# Patient Record
Sex: MALE | Race: WHITE | NOT HISPANIC OR LATINO | URBAN - METROPOLITAN AREA
[De-identification: names, ages, dates, MRNs, and addresses within clinical notes are randomized per-mention and may not be internally consistent; named-entity substitution may affect disease eponyms.]

---

## 2024-06-26 PROBLEM — S81.801A: Status: ACTIVE | Noted: 2024-06-26

## 2024-06-26 PROBLEM — S86.901A: Status: ACTIVE | Noted: 2024-06-26

## 2024-06-26 PROBLEM — S81.001A: Status: ACTIVE | Noted: 2024-06-26

## 2024-06-26 PROBLEM — S72.431B: Status: ACTIVE | Noted: 2024-06-26

## 2024-06-26 PROBLEM — R79.89 ELEVATED TROPONIN: Status: ACTIVE | Noted: 2024-06-26

## 2024-06-26 PROBLEM — S96.901A: Status: ACTIVE | Noted: 2024-06-26

## 2024-06-26 PROBLEM — Z73.6 LIMITATION OF ACTIVITIES DUE TO DISABILITY: Status: ACTIVE | Noted: 2024-06-26

## 2024-06-26 PROBLEM — S91.001A: Status: ACTIVE | Noted: 2024-06-26

## 2024-06-27 ENCOUNTER — TELEPHONE (OUTPATIENT)
Dept: ORTHOPEDICS | Facility: CLINIC | Age: 24
End: 2024-06-27

## 2024-06-27 NOTE — TELEPHONE ENCOUNTER
----- Message from Cedric French MD sent at 6/27/2024 11:47 AM CDT -----  Regarding: postop patient  Open right knee fracture patient who needs 2 week (or sooner) postop visit.  Will need NWB 3-views Right knee

## 2024-07-02 DIAGNOSIS — M25.562 LEFT KNEE PAIN, UNSPECIFIED CHRONICITY: Primary | ICD-10-CM

## 2024-07-03 ENCOUNTER — HOSPITAL ENCOUNTER (OUTPATIENT)
Dept: RADIOLOGY | Facility: HOSPITAL | Age: 24
Discharge: HOME | End: 2024-07-03
Attending: ORTHOPAEDIC SURGERY
Payer: MEDICAID

## 2024-07-03 ENCOUNTER — OFFICE VISIT (OUTPATIENT)
Dept: ORTHOPEDICS | Facility: CLINIC | Age: 24
End: 2024-07-03
Payer: MEDICAID

## 2024-07-03 DIAGNOSIS — S72.431B: Primary | ICD-10-CM

## 2024-07-03 DIAGNOSIS — S81.801A OPEN WOUND KNEE/LEG WITH TENDON INVOLVMENT, RIGHT, INITIAL ENCOUNTER: ICD-10-CM

## 2024-07-03 DIAGNOSIS — S91.001A OPEN WOUND KNEE/LEG WITH TENDON INVOLVMENT, RIGHT, INITIAL ENCOUNTER: ICD-10-CM

## 2024-07-03 DIAGNOSIS — S86.901A OPEN WOUND KNEE/LEG WITH TENDON INVOLVMENT, RIGHT, INITIAL ENCOUNTER: ICD-10-CM

## 2024-07-03 DIAGNOSIS — S81.001A OPEN WOUND KNEE/LEG WITH TENDON INVOLVMENT, RIGHT, INITIAL ENCOUNTER: ICD-10-CM

## 2024-07-03 DIAGNOSIS — S96.901A OPEN WOUND KNEE/LEG WITH TENDON INVOLVMENT, RIGHT, INITIAL ENCOUNTER: ICD-10-CM

## 2024-07-03 DIAGNOSIS — M25.562 LEFT KNEE PAIN, UNSPECIFIED CHRONICITY: ICD-10-CM

## 2024-07-03 PROCEDURE — 73560 X-RAY EXAM OF KNEE 1 OR 2: CPT | Mod: 26,RT,, | Performed by: RADIOLOGY

## 2024-07-03 PROCEDURE — 99212 OFFICE O/P EST SF 10 MIN: CPT | Mod: PBBFAC,25,PO | Performed by: ORTHOPAEDIC SURGERY

## 2024-07-03 PROCEDURE — 73560 X-RAY EXAM OF KNEE 1 OR 2: CPT | Mod: TC,PO,RT

## 2024-07-03 PROCEDURE — 99999 PR PBB SHADOW E&M-EST. PATIENT-LVL II: CPT | Mod: PBBFAC,,, | Performed by: ORTHOPAEDIC SURGERY

## 2024-07-03 RX ORDER — OXYCODONE AND ACETAMINOPHEN 5; 325 MG/1; MG/1
1 TABLET ORAL EVERY 8 HOURS PRN
Qty: 21 TABLET | Refills: 0 | Status: SHIPPED | OUTPATIENT
Start: 2024-07-03 | End: 2024-07-10

## 2024-07-03 NOTE — PROGRESS NOTES
"Status/Diagnosis: Open Right distal femur medial condyle fracture; Right knee traumatic arthrotomy.  Date of Surgery: 06/26/2024  Date of Injury: 06/25/2024  Return visit: 1 week  X-rays on Return: 2-views Right knee      Present History:  Audrey Gant is a 23 y.o. male who presents today with his grandmother for his 1st postop clinic visit.  Currently using a walker for ambulation.  Patient never worked with physical therapy prior to leaving the hospital AMA.  He has been taking the oral Keflex as prescribed, last day tomorrow.  Wearing the knee immobilizer "most of the time .  Denies any drainage, fever, chills.      No past medical history on file.    Past Surgical History:   Procedure Laterality Date    IRRIGATION AND DEBRIDEMENT Right 6/25/2024    Procedure: IRRIGATION AND DEBRIDEMENT right distal femur;  Surgeon: Cedric French MD;  Location: [facility];  Service: Orthopedics;  Laterality: Right;    ORIF FEMUR FRACTURE Right 6/25/2024    Procedure: ORIF, FRACTURE, FEMUR distal;  Surgeon: Cedric French MD;  Location: [facility] OR;  Service: Orthopedics;  Laterality: Right;       Current Outpatient Medications   Medication Sig    SUBOXONE 8-2 mg Place 0.5 Film under the tongue 2 (two) times daily.     No current facility-administered medications for this visit.       Social History     Socioeconomic History    Marital status: Single     Social Determinants of Health     Food Insecurity: No Food Insecurity (6/26/2024)    Hunger Vital Sign     Worried About Running Out of Food in the Last Year: Never true     Ran Out of Food in the Last Year: Never true   Transportation Needs: No Transportation Needs (6/26/2024)    TRANSPORTATION NEEDS     Transportation : No   Housing Stability: Low Risk  (6/26/2024)    Housing Stability Vital Sign     Unable to Pay for Housing in the Last Year: No     Homeless in the Last Year: No       Physical exam:  There were no vitals filed for this visit.  There is no height or weight on " file to calculate BMI.  General: In no apparent distress; well developed and well nourished.  HEENT: normocephalic; atraumatic.  Cardiovascular: regular rate.  Respiratory: no increased work of breathing.  Musculoskeletal:   Gait: did not assess  Inspection:  Surgical site healing well with staples in place.  Mild moderate residual fusion but significantly improved.  No warmth or erythema.  No signs or symptoms of infection.  Patient with weakness compared to the left but with intact extensor mechanism.  Difficult to assess ligamentous stability due to patient pain/guarding.  Knee range of motion from just short of full extension to 80° flexion.  Gross motor and sensory function intact.  Palpable DP/PT pulse.                   Imaging Studies/Outside documentation:  I have ordered/reviewed/interpreted the following images/outside documentation:  1. NWB 3-views Right knee:  Patient was status post ORIF of comminuted vertical shear variant medial condyle distal femur fracture.  Overall alignment well-maintained.  No evidence of implant loosening or failure.  As previously noted, patient with tibial IM nail in good alignment.        Assessment:  Audrey Gant is a 23 y.o. male with Open Right distal femur medial condyle fracture; Right knee traumatic arthrotomy.     Plan:   Clinical and radiographic findings were discussed with the patient and his grandmother who accompanies him today.    Reiterated the importance of patient remain strict nonweightbearing right lower extremity.  Knee immobilizer to remain on at all times except for when performing active knee range of motion exercises from 0-90 degrees.    Complete 7 day course of oral Keflex.    I again discussed the severity of the injury and patient being extremely high risk for early onset posttraumatic arthritis.    Patient and family voiced understanding.  All questions were answered.  Patient inquiring about prescription pain medication.  Upon  review, patient  was noted to be on Suboxone in early June.  On questioning, he has been off of all opioids for the last 3+ months.  I did provide a 1 time prescription for oral Percocet to be taken only for severe, breakthrough pain.    Recommend he resume visits with his pain management doctor for continued Suboxone.    This note was created using voice recognition software and may contain grammatical errors.

## 2024-07-08 DIAGNOSIS — S72.431B: Primary | ICD-10-CM

## 2024-07-09 ENCOUNTER — HOSPITAL ENCOUNTER (OUTPATIENT)
Dept: RADIOLOGY | Facility: HOSPITAL | Age: 24
Discharge: HOME | End: 2024-07-09
Attending: ORTHOPAEDIC SURGERY
Payer: MEDICAID

## 2024-07-09 ENCOUNTER — OFFICE VISIT (OUTPATIENT)
Dept: ORTHOPEDICS | Facility: CLINIC | Age: 24
End: 2024-07-09
Payer: MEDICAID

## 2024-07-09 DIAGNOSIS — S72.431B: ICD-10-CM

## 2024-07-09 DIAGNOSIS — S72.431B: Primary | ICD-10-CM

## 2024-07-09 PROCEDURE — 73560 X-RAY EXAM OF KNEE 1 OR 2: CPT | Mod: TC,PO,RT

## 2024-07-09 PROCEDURE — 99999 PR PBB SHADOW E&M-EST. PATIENT-LVL I: CPT | Mod: PBBFAC,,, | Performed by: ORTHOPAEDIC SURGERY

## 2024-07-09 PROCEDURE — 99211 OFF/OP EST MAY X REQ PHY/QHP: CPT | Mod: PBBFAC,25,PO | Performed by: ORTHOPAEDIC SURGERY

## 2024-07-09 NOTE — PROGRESS NOTES
Status/Diagnosis: Open Right distal femur medial condyle fracture; Right knee traumatic arthrotomy.  Date of Surgery: 06/26/2024  Date of Injury: 06/25/2024  Return visit: 07/26/2024  X-rays on Return: 2-views Right knee      Present History:  Audrey Gant is a 23 y.o. male who returns today for 2 week postop visit.  He was accompanied by his girlfriend.  Using crutches but likely noncompliance in regard to nonweightbearing status.  He was not wearing his knee immobilizer today.    He states he has been performing home knee range of motion exercises in bed as instructed.  Denies any drainage, fever, chills.  Patient was already gone through the prescription for oral Percocet.  He has not tried to reestablish care with Dr. Frank who has been providing Suboxone for many months.  Patient with cigarettes on him today.  Smoking 1+ pack per day.      No past medical history on file.    Past Surgical History:   Procedure Laterality Date    IRRIGATION AND DEBRIDEMENT Right 6/25/2024    Procedure: IRRIGATION AND DEBRIDEMENT right distal femur;  Surgeon: Cedric French MD;  Location: [facility] OR;  Service: Orthopedics;  Laterality: Right;    ORIF FEMUR FRACTURE Right 6/25/2024    Procedure: ORIF, FRACTURE, FEMUR distal;  Surgeon: Cedric French MD;  Location: [facility] OR;  Service: Orthopedics;  Laterality: Right;       Current Outpatient Medications   Medication Sig    oxyCODONE-acetaminophen (PERCOCET) 5-325 mg per tablet Take 1 tablet by mouth every 8 (eight) hours as needed for Pain.    SUBOXONE 8-2 mg Place 0.5 Film under the tongue 2 (two) times daily. (Patient not taking: Reported on 7/9/2024)     No current facility-administered medications for this visit.       Social History     Socioeconomic History    Marital status: Single     Social Determinants of Health     Food Insecurity: No Food Insecurity (6/26/2024)    Hunger Vital Sign     Worried About Running Out of Food in the Last Year: Never true     Ran Out of  Food in the Last Year: Never true   Transportation Needs: No Transportation Needs (6/26/2024)    TRANSPORTATION NEEDS     Transportation : No   Housing Stability: Low Risk  (6/26/2024)    Housing Stability Vital Sign     Unable to Pay for Housing in the Last Year: No     Homeless in the Last Year: No       Physical exam:  There were no vitals filed for this visit.  There is no height or weight on file to calculate BMI.  General: In no apparent distress; well developed and well nourished.  HEENT: normocephalic; atraumatic.  Cardiovascular: regular rate.  Respiratory: no increased work of breathing.  Musculoskeletal:   Gait: did not assess  Inspection:    Surgical site well healed with staples in place.  Superficial abrasions about the knee are improved.  Mild-to-moderate residual knee effusion.  No warmth or erythema.  No signs or symptoms of infection.  No anahi laxity with varus/valgus stress.  Difficult to assess Lachman's testing.  Passive range of motion of the knee from just short of full extension to 90° flexion.  Patient with 10-15 degree extensor lag -- thought to be related to patient pain/guarding.  Gross motor and sensory function intact.  Palpable DP/PT pulses.                   Imaging Studies/Outside documentation:  I have ordered/reviewed/interpreted the following images/outside documentation:  1. NWB 3-views Right knee:  Patient is status post ORIF of comminuted vertical shear variant medial condyle distal femur fracture.  Overall alignment well-maintained.  No evidence of implant loosening or failure.  As previously noted, patient with tibial IM nail in good alignment.        Assessment:  Audrey Gant is a 23 y.o. male with Open Right distal femur medial condyle fracture; Right knee traumatic arthrotomy.     Plan:   Clinical and radiographic findings were discussed again at length with the patient and his girlfriend who accompanies him today.    Staples removed and Steri-Strips placed.  Okay for  showers but no baths/submersion of the wound.    Patient to remain strict NON-weight-bearing right lower extremity.    Patient to perform home active knee range of motion exercises while lying in bed from full extension to 90° flexion.  Still recommend knee immobilizer use when attempting to leave his home.  Reiterated the importance of smoking cessation as it pertains to bone and wound healing.    Did does not seen patient has any intention of cutting back.  Follow up in 2-3 weeks for repeat evaluation and x-ray.  Possible initiation of weight-bearing at the 6 week postoperative ant.      This note was created using voice recognition software and may contain grammatical errors.

## 2024-07-24 DIAGNOSIS — S72.431B: Primary | ICD-10-CM

## 2024-07-31 ENCOUNTER — TELEPHONE (OUTPATIENT)
Dept: ORTHOPEDICS | Facility: CLINIC | Age: 24
End: 2024-07-31
Payer: MEDICAID

## 2024-07-31 NOTE — TELEPHONE ENCOUNTER
----- Message from Iraida Meza sent at 7/31/2024  2:23 PM CDT -----  Type:  Needs Medical Advice    Who Called:  pt  Symptoms (please be specific):  Post opt  How long has patient had these symptoms:     Pharmacy name and phone #:     Would the patient rather a call back or a response via [facility]?  Call   Best Call Back Number:  [phone]  Additional Information: called to reschedule  post opt

## 2024-07-31 NOTE — TELEPHONE ENCOUNTER
Pt stated he want to schedule for next week. I informed pt Dr French is out next week.   Pt would like to come in week of 8/12.   I made an appt for 8/15 at 3:15 with pt.

## 2024-08-06 DIAGNOSIS — S72.431B: Primary | ICD-10-CM

## 2024-08-21 ENCOUNTER — TELEPHONE (OUTPATIENT)
Dept: ORTHOPEDICS | Facility: CLINIC | Age: 24
End: 2024-08-21
Payer: MEDICAID

## 2024-08-21 NOTE — TELEPHONE ENCOUNTER
[facility] with [facility]. Fax stated we needed to send records and asked if Dr French would like to continue care.    I called Katia Gomez RN at [facility] and set up an appt on 9/11 at 3:30 as directed from Dr French. I also faxed over pt records.

## 2024-09-10 ENCOUNTER — TELEPHONE (OUTPATIENT)
Dept: ORTHOPEDICS | Facility: CLINIC | Age: 24
End: 2024-09-10
Payer: MEDICAID